# Patient Record
Sex: FEMALE | Race: WHITE | NOT HISPANIC OR LATINO | Employment: UNEMPLOYED | URBAN - METROPOLITAN AREA
[De-identification: names, ages, dates, MRNs, and addresses within clinical notes are randomized per-mention and may not be internally consistent; named-entity substitution may affect disease eponyms.]

---

## 2017-09-21 ENCOUNTER — HOSPITAL ENCOUNTER (EMERGENCY)
Facility: HOSPITAL | Age: 37
Discharge: HOME | End: 2017-09-21
Attending: EMERGENCY MEDICINE
Payer: MEDICAID

## 2017-09-21 VITALS
DIASTOLIC BLOOD PRESSURE: 82 MMHG | WEIGHT: 104 LBS | BODY MASS INDEX: 19.14 KG/M2 | SYSTOLIC BLOOD PRESSURE: 127 MMHG | HEIGHT: 62 IN | TEMPERATURE: 99 F | OXYGEN SATURATION: 99 % | HEART RATE: 88 BPM | RESPIRATION RATE: 16 BRPM

## 2017-09-21 DIAGNOSIS — L02.91 ABSCESS: Primary | ICD-10-CM

## 2017-09-21 LAB
B-HCG UR QL: NEGATIVE
CTP QC/QA: YES

## 2017-09-21 PROCEDURE — 99283 EMERGENCY DEPT VISIT LOW MDM: CPT | Mod: 25

## 2017-09-21 PROCEDURE — 25000003 PHARM REV CODE 250: Performed by: EMERGENCY MEDICINE

## 2017-09-21 PROCEDURE — 81025 URINE PREGNANCY TEST: CPT

## 2017-09-21 PROCEDURE — 10060 I&D ABSCESS SIMPLE/SINGLE: CPT

## 2017-09-21 RX ORDER — FLUTICASONE PROPIONATE 50 MCG
1 SPRAY, SUSPENSION (ML) NASAL DAILY
COMMUNITY
End: 2017-10-27

## 2017-09-21 RX ORDER — HYDROCODONE BITARTRATE AND ACETAMINOPHEN 5; 325 MG/1; MG/1
1 TABLET ORAL EVERY 6 HOURS PRN
Qty: 12 TABLET | Refills: 0 | Status: SHIPPED | OUTPATIENT
Start: 2017-09-21 | End: 2017-10-27

## 2017-09-21 RX ORDER — BUPROPION HYDROCHLORIDE 300 MG/1
300 TABLET ORAL DAILY
COMMUNITY
End: 2019-05-01

## 2017-09-21 RX ORDER — ACETAMINOPHEN AND CODEINE PHOSPHATE 300; 60 MG/1; MG/1
TABLET ORAL
COMMUNITY
End: 2017-10-27

## 2017-09-21 RX ORDER — OXCARBAZEPINE 600 MG/1
150 TABLET, FILM COATED ORAL 2 TIMES DAILY
COMMUNITY
End: 2019-05-01

## 2017-09-21 RX ORDER — ACYCLOVIR 800 MG/1
200 TABLET ORAL
COMMUNITY
End: 2017-10-27

## 2017-09-21 RX ORDER — LIDOCAINE HYDROCHLORIDE 10 MG/ML
10 INJECTION INFILTRATION; PERINEURAL
Status: COMPLETED | OUTPATIENT
Start: 2017-09-21 | End: 2017-09-21

## 2017-09-21 RX ORDER — SULFAMETHOXAZOLE AND TRIMETHOPRIM 800; 160 MG/1; MG/1
1 TABLET ORAL
COMMUNITY
End: 2017-10-27

## 2017-09-21 RX ADMIN — LIDOCAINE HYDROCHLORIDE 10 ML: 10 INJECTION, SOLUTION INFILTRATION; PERINEURAL at 01:09

## 2017-09-21 RX ADMIN — Medication 3 ML: at 12:09

## 2017-09-21 NOTE — ED PROVIDER NOTES
Encounter Date: 9/21/2017       History     Chief Complaint   Patient presents with    Female  Problem     reports round lesion to vagina since saturday. pt was evaluated at [facility] and sent home with abx with no relief. pt recently dx with MRSA and is currently on medications.  pt would like for lesion to be looked at again     36F presents with a pubic abscess.  She states she has hx of MRSA.  She developed a bump over the weekend.  It has gotten bigger and is very painful.  She reports constant burning pain.  She went to primary care clinic for evaluation but states no one looked at it.  She was given acyclovir and bactrim.          Review of patient's allergies indicates:  No Known Allergies  Past Medical History:   Diagnosis Date    Behavioral problem     Pt reported only when she is drinking    Depression     History of psychiatric hospitalization     Hx of psychiatric care     Ovarian cyst     Psychiatric exam requested by authority     Psychiatric problem     PTSD (post-traumatic stress disorder)     STD (female)     Therapy      History reviewed. No pertinent surgical history.  Family History   Problem Relation Age of Onset    Depression Mother     Alcohol abuse Mother     Drug abuse Mother     Alcohol abuse Father     Drug abuse Father     Physical abuse Father     Depression Sister     Alcohol abuse Brother      Social History   Substance Use Topics    Smoking status: Current Every Day Smoker     Packs/day: 0.50     Years: 22.00    Smokeless tobacco: Never Used    Alcohol use 10.8 oz/week     18 Cans of beer per week      Comment: Pt reported her last drink was 7/19/16     Review of Systems   Constitutional: Negative for fever.   All other systems reviewed and are negative.      Physical Exam     Initial Vitals [09/21/17 1136]   BP Pulse Resp Temp SpO2   113/73 (!) 114 20 99 °F (37.2 °C) 100 %      MAP       86.33         Physical Exam    Nursing note and vitals  reviewed.  Constitutional: She appears well-developed and well-nourished. No distress.   HENT:   Head: Normocephalic and atraumatic.   Eyes: Conjunctivae are normal.   Neck: Normal range of motion.   Pulmonary/Chest: No respiratory distress.   Musculoskeletal: Normal range of motion.   Neurological: She is alert and oriented to person, place, and time.   Skin: Skin is warm and dry.        Abscess in pubic area   Psychiatric: She has a normal mood and affect. Her behavior is normal.         ED Course   I & D - Incision and Drainage  Date/Time: 9/21/2017 1:37 PM  Performed by: AINSLEY PETERS.  Authorized by: AINSLEY PETERS.   Consent Done: Emergent Situation  Type: abscess  Body area: trunk (pubic area)  Anesthesia: local infiltration    Anesthesia:  Local Anesthetic: lidocaine 1% without epinephrine  Patient sedated: no  Scalpel size: 11  Incision type: single straight  Complexity: simple  Drainage: pus  Drainage amount: copious  Wound treatment: incision,  drainage,  deloculation,  expression of material and  wound packed  Packing material: 1/4 in gauze  Patient tolerance: Patient tolerated the procedure well with no immediate complications        Labs Reviewed - No data to display          Medical Decision Making:   ED Management:  36F with pubic abscess - on abx and not getting better.  I&D performed.  Wound packed.  Continue bactrim.  Return to ER for any concerns.                   ED Course      Clinical Impression:   The encounter diagnosis was Abscess.                           Ainsley Peters MD  09/21/17 5989

## 2017-10-27 ENCOUNTER — HOSPITAL ENCOUNTER (EMERGENCY)
Facility: HOSPITAL | Age: 37
Discharge: HOME | End: 2017-10-27
Attending: EMERGENCY MEDICINE
Payer: MEDICAID

## 2017-10-27 VITALS
DIASTOLIC BLOOD PRESSURE: 61 MMHG | BODY MASS INDEX: 19.14 KG/M2 | RESPIRATION RATE: 19 BRPM | WEIGHT: 104 LBS | TEMPERATURE: 98 F | HEART RATE: 100 BPM | OXYGEN SATURATION: 100 % | HEIGHT: 62 IN | SYSTOLIC BLOOD PRESSURE: 122 MMHG

## 2017-10-27 DIAGNOSIS — L02.31 ABSCESS OF BUTTOCK, RIGHT: ICD-10-CM

## 2017-10-27 DIAGNOSIS — L02.411 ABSCESS OF AXILLA, RIGHT: Primary | ICD-10-CM

## 2017-10-27 LAB
B-HCG UR QL: NEGATIVE
CTP QC/QA: YES

## 2017-10-27 PROCEDURE — 99284 EMERGENCY DEPT VISIT MOD MDM: CPT

## 2017-10-27 RX ORDER — CLINDAMYCIN HYDROCHLORIDE 150 MG/1
300 CAPSULE ORAL 4 TIMES DAILY
Qty: 56 CAPSULE | Refills: 0 | Status: SHIPPED | OUTPATIENT
Start: 2017-10-27 | End: 2017-11-03

## 2017-10-27 RX ORDER — MUPIROCIN 20 MG/G
OINTMENT TOPICAL 3 TIMES DAILY
Qty: 22 G | Refills: 0 | Status: SHIPPED | OUTPATIENT
Start: 2017-10-27 | End: 2019-05-01

## 2017-10-27 RX ORDER — HYDROCODONE BITARTRATE AND ACETAMINOPHEN 7.5; 325 MG/1; MG/1
1 TABLET ORAL EVERY 6 HOURS PRN
Qty: 20 TABLET | Refills: 0 | Status: SHIPPED | OUTPATIENT
Start: 2017-10-27 | End: 2017-11-06

## 2017-10-28 NOTE — ED PROVIDER NOTES
Encounter Date: 10/27/2017       History     Chief Complaint   Patient presents with    Abscess     multiple abscesses to axilla and buttocks.      Patient is a 36-year-old female who complains of multiple abscesses. She says she has one under her right arm as well as on her buttock.  She has had these before and diagnosed with MRSA.  She has had no fever or chills.  No nausea or vomiting.  She complains of severe pain associated with these.       The history is provided by the patient.     Review of patient's allergies indicates:  No Known Allergies  Past Medical History:   Diagnosis Date    Behavioral problem     Pt reported only when she is drinking    Depression     History of psychiatric hospitalization     Hx of psychiatric care     Ovarian cyst     Psychiatric exam requested by authority     Psychiatric problem     PTSD (post-traumatic stress disorder)     STD (female)     Therapy      History reviewed. No pertinent surgical history.  Family History   Problem Relation Age of Onset    Depression Mother     Alcohol abuse Mother     Drug abuse Mother     Alcohol abuse Father     Drug abuse Father     Physical abuse Father     Depression Sister     Alcohol abuse Brother      Social History   Substance Use Topics    Smoking status: Current Every Day Smoker     Packs/day: 0.50     Years: 22.00    Smokeless tobacco: Never Used    Alcohol use 10.8 oz/week     18 Cans of beer per week      Comment: Pt reported her last drink was 7/19/16     Review of Systems   Constitutional: Negative for chills and fever.   Gastrointestinal: Negative for nausea and vomiting.   Skin: Positive for wound.   All other systems reviewed and are negative.      Physical Exam     Initial Vitals [10/27/17 1214]   BP Pulse Resp Temp SpO2   122/61 100 19 98.3 °F (36.8 °C) 100 %      MAP       81.33         Physical Exam    Nursing note and vitals reviewed.  Constitutional:   Anxious.   HENT:   Head: Normocephalic and  atraumatic.   Eyes: EOM are normal.   Neck: Neck supple.   Cardiovascular: Normal rate, regular rhythm and normal heart sounds.   Pulmonary/Chest: Breath sounds normal.   Abdominal: Soft. There is no tenderness.   Neurological: She is alert and oriented to person, place, and time.   Skin: Skin is warm and dry.   There is a small tender nodule of the right axilla with no overlying erythema, no fluctuance.  There is also a very small less than 1 cm abscess of the right buttock.   Psychiatric: Thought content normal.         ED Course   Procedures  Labs Reviewed - No data to display          Medical Decision Making:   ED Management:  36-year-old female with an early abscess of the right axilla and right buttock.  I do not feel these are currently amenable to incision and drainage.  She'll be placed on clindamycin and Bactroban ointment.  She'll follow-up with primary physician as soon as able for recheck and further treatment as warranted.                   ED Course      Clinical Impression:   The primary encounter diagnosis was Abscess of axilla, right. A diagnosis of Abscess of buttock, right was also pertinent to this visit.                           Adam Herron MD  10/28/17 6204

## 2018-06-16 ENCOUNTER — HOSPITAL ENCOUNTER (EMERGENCY)
Facility: HOSPITAL | Age: 38
Discharge: HOME | End: 2018-06-16
Attending: EMERGENCY MEDICINE
Payer: MEDICAID

## 2018-06-16 VITALS
BODY MASS INDEX: 19.63 KG/M2 | RESPIRATION RATE: 18 BRPM | HEART RATE: 82 BPM | WEIGHT: 104 LBS | DIASTOLIC BLOOD PRESSURE: 77 MMHG | HEIGHT: 61 IN | TEMPERATURE: 97 F | OXYGEN SATURATION: 100 % | SYSTOLIC BLOOD PRESSURE: 118 MMHG

## 2018-06-16 DIAGNOSIS — M25.519 SHOULDER PAIN, UNSPECIFIED CHRONICITY, UNSPECIFIED LATERALITY: Primary | ICD-10-CM

## 2018-06-16 DIAGNOSIS — R52 PAIN: ICD-10-CM

## 2018-06-16 LAB
B-HCG UR QL: NEGATIVE
CTP QC/QA: YES

## 2018-06-16 PROCEDURE — 63600175 PHARM REV CODE 636 W HCPCS: Performed by: NURSE PRACTITIONER

## 2018-06-16 PROCEDURE — 96372 THER/PROPH/DIAG INJ SC/IM: CPT

## 2018-06-16 PROCEDURE — 99284 EMERGENCY DEPT VISIT MOD MDM: CPT | Mod: 25

## 2018-06-16 PROCEDURE — 81025 URINE PREGNANCY TEST: CPT | Performed by: NURSE PRACTITIONER

## 2018-06-16 RX ORDER — NAPROXEN 500 MG/1
500 TABLET ORAL 2 TIMES DAILY WITH MEALS
Qty: 10 TABLET | Refills: 0 | Status: SHIPPED | OUTPATIENT
Start: 2018-06-16 | End: 2018-06-16 | Stop reason: CLARIF

## 2018-06-16 RX ORDER — IBUPROFEN 600 MG/1
600 TABLET ORAL EVERY 6 HOURS PRN
Qty: 20 TABLET | Refills: 0 | Status: SHIPPED | OUTPATIENT
Start: 2018-06-16 | End: 2019-05-01

## 2018-06-16 RX ORDER — CLONIDINE HYDROCHLORIDE 0.2 MG/1
0.2 TABLET ORAL 2 TIMES DAILY
COMMUNITY
End: 2019-05-01

## 2018-06-16 RX ORDER — KETOROLAC TROMETHAMINE 30 MG/ML
30 INJECTION, SOLUTION INTRAMUSCULAR; INTRAVENOUS
Status: COMPLETED | OUTPATIENT
Start: 2018-06-16 | End: 2018-06-16

## 2018-06-16 RX ADMIN — KETOROLAC TROMETHAMINE 30 MG: 30 INJECTION, SOLUTION INTRAMUSCULAR at 10:06

## 2018-06-17 NOTE — ED PROVIDER NOTES
"Encounter Date: 6/16/2018       History     Chief Complaint   Patient presents with    Shoulder Injury     37y F ambulatory to ED with c/o right shoulder and upper arm pain. pt reports feeling a "tearing" while swimming yesterday and pain increasing after fishing today     Patient is a 37-year-old female with past medical history of depression who presents to ED with right shoulder and rib pain x3 weeks.  Patient reports that 3 weeks ago she was on her front porch and accidentally fell on top of a baby gate.  Patient denies hitting her head and LOC.  Patient reports that she went swimming yesterday and fishing today and both activities increase her pain. Patient reports that she has been taking ibuprofen with minor relief, last dose was at 1600 tonight.  Patient reports that the pain is worse with movement and palpation. Patient denies any alleviating factors.  Patient denies fever, chills, dizziness, neck pain/stiffness, weakness, numbness, and tingling. Pt denies any other complaints at this time.       The history is provided by the patient.     Review of patient's allergies indicates:  No Known Allergies  Past Medical History:   Diagnosis Date    Behavioral problem     Pt reported only when she is drinking    Depression     History of psychiatric hospitalization     Hx of psychiatric care     Ovarian cyst     Psychiatric exam requested by authority     Psychiatric problem     PTSD (post-traumatic stress disorder)     STD (female)     Therapy      History reviewed. No pertinent surgical history.  Family History   Problem Relation Age of Onset    Depression Mother     Alcohol abuse Mother     Drug abuse Mother     Alcohol abuse Father     Drug abuse Father     Physical abuse Father     Depression Sister     Alcohol abuse Brother      Social History   Substance Use Topics    Smoking status: Current Every Day Smoker     Packs/day: 0.50     Years: 22.00    Smokeless tobacco: Never Used    " Alcohol use 10.8 oz/week     18 Cans of beer per week      Comment: Pt reported her last drink was 7/19/16     Review of Systems   Constitutional: Negative for chills and fever.   HENT: Negative for sore throat.    Respiratory: Negative for shortness of breath.    Cardiovascular: Negative for chest pain.   Musculoskeletal: Positive for arthralgias. Negative for back pain, joint swelling, neck pain and neck stiffness.        R shoulder and rib area   Skin: Negative for rash.   Neurological: Negative for dizziness, weakness and numbness.   Hematological: Does not bruise/bleed easily.   Psychiatric/Behavioral: The patient is nervous/anxious.        Physical Exam     Initial Vitals [06/16/18 2141]   BP Pulse Resp Temp SpO2   115/69 90 18 97.1 °F (36.2 °C) 100 %      MAP       --         Physical Exam    Nursing note and vitals reviewed.  Constitutional: Vital signs are normal. She appears well-developed and well-nourished. She is not diaphoretic.  Non-toxic appearance. She does not have a sickly appearance.   HENT:   Head: Normocephalic.   Eyes: Lids are normal.   Neck: Trachea normal, normal range of motion, full passive range of motion without pain and phonation normal. Neck supple.   Cardiovascular: Normal rate, regular rhythm and normal heart sounds.   Pulses:       Radial pulses are 2+ on the right side, and 2+ on the left side.   Pulmonary/Chest: Effort normal and breath sounds normal.   Musculoskeletal:        Right shoulder: She exhibits decreased range of motion, tenderness and pain. She exhibits no bony tenderness, no swelling, no effusion, no crepitus, no deformity, no laceration, no spasm, normal pulse and normal strength.   TTP to R shoulder and pain with abduction. No bony tenderness or deformity. Pt NVI.   Neurological: She is alert and oriented to person, place, and time. She has normal strength. No sensory deficit. Gait normal. GCS eye subscore is 4. GCS verbal subscore is 5. GCS motor subscore is 6.    Skin: Skin is warm, dry and intact. Capillary refill takes less than 2 seconds. No rash noted.   Psychiatric: Her mood appears anxious.         ED Course   Procedures  Labs Reviewed   POCT URINE PREGNANCY         Imaging Results          X-Ray Ribs 2 View Right (Final result)  Result time 06/16/18 22:33:04    Final result by Bong Ross MD (06/16/18 22:33:04)                 Impression:      No radiographic evidence of acute displaced right-sided rib fracture.      Electronically signed by: Bong Ross MD  Date:    06/16/2018  Time:    22:33             Narrative:    EXAMINATION:  XR RIBS 2 VIEW RIGHT    CLINICAL HISTORY:  Pain, unspecified    TECHNIQUE:  Two views of the right ribs were performed.    COMPARISON:  None    FINDINGS:  No definite displaced right-sided rib fractures are identified.  The right hemithorax appears free of consolidation.                               X-Ray Shoulder Trauma Right (Final result)  Result time 06/16/18 22:30:18    Final result by Bong Ross MD (06/16/18 22:30:18)                 Impression:      No radiographic evidence of acute fracture or dislocation of the right shoulder.      Electronically signed by: Bong Ross MD  Date:    06/16/2018  Time:    22:30             Narrative:    EXAMINATION:  XR SHOULDER TRAUMA 3 VIEW RIGHT    CLINICAL HISTORY:  Pain, unspecified    TECHNIQUE:  Three or four views of the right shoulder were performed.    COMPARISON:  None    FINDINGS:  There is no evidence of acute fracture or dislocation.  Glenohumeral alignment appears within normal limits.  The acromioclavicular joint appears well maintained.                                X-Ray Ribs 2 View Right   Final Result      No radiographic evidence of acute displaced right-sided rib fracture.         Electronically signed by: Bong Ross MD   Date:    06/16/2018   Time:    22:33      X-Ray Shoulder Trauma Right   Final Result      No radiographic evidence of acute  fracture or dislocation of the right shoulder.         Electronically signed by: Bong Ross MD   Date:    06/16/2018   Time:    22:30           Medical Decision Making:   Initial Assessment:   Pt presents to ED with R shoulder and rib area pain x 3 weeks. Pt appears anxious and non-toxic. 2+ radial pulses bilaterally by palpation. Sensation and strength intact bilaterally in upper extremities. Pt NVI.   Differential Diagnosis:   Sprain, strain, fracture, dislocation   Clinical Tests:   Radiological Study: Ordered and Reviewed  ED Management:  POCT pregnancy, Xray shoulder and rib, 30 mg IM toradol  Xrays normal. Pt is stable and will be d/c home. Pt instructed to use R.I.C.E. And to take prescribed Motrin as labeled as needed for pain. Patient should follow up with PCP within 2-3 days.  Return precautions given.    Rx: motrin                       Clinical Impression:   The primary encounter diagnosis was Shoulder pain, unspecified chronicity, unspecified laterality. A diagnosis of Pain was also pertinent to this visit.                             Dustin Pang NP  06/16/18 0729

## 2018-06-17 NOTE — ED TRIAGE NOTES
Patient presents to the ED with complaints of right upper arm, and right rib pain. Patient states 3 weeks ago she fell on her concrete porch on top of a baby gate. She was in pain but was not seen by a doctor. Yesterday she went swimming, after not using arm for 3 weeks, and while swimming became in a lot of pain. Today she also went fishing and cleaned her house. From 3 weeks till now she stated she went through a whole bottle of ibuprofen with the last dose being today around 4 pm. Patient expresses pain with abduction of right arm.  Patient holding arm up to torso.     Review of patient's allergies indicates:  No Known Allergies     Patient has verified the spelling of their name and  on armband.   APPEARANCE: Patient is alert, calm, oriented x 4, and does not appear distressed.  SKIN: Skin is normal for race, warm, and dry. Normal skin turgor and mucous membranes moist. Small red line noted by right rib area where patient stated she fell, no bruising noted  CARDIAC: Normal rate and rhythm, no murmur heard.   RESPIRATORY:Normal rate and effort. Breath sounds clear bilaterally throughout chest. Respirations are equal and unlabored.    GASTRO: Bowel sounds normal, abdomen is soft, no tenderness, and no abdominal distention.  MUSCLE: patient expresses pain with abduction of right arm. States right upper arm and right rib area are sensitive to touch. No deformity noted

## 2018-06-17 NOTE — DISCHARGE INSTRUCTIONS
Your xrays were normal. Please use R.I.C.E. And take naproxen as labeled and as needed for pain. Follow-up with [facility] within 2-3 days. Return to ED if symptoms worsen or change.

## 2018-10-17 ENCOUNTER — HOSPITAL ENCOUNTER (OUTPATIENT)
Dept: RADIOLOGY | Facility: OTHER | Age: 38
Discharge: HOME | End: 2018-10-17
Attending: NURSE PRACTITIONER
Payer: MEDICAID

## 2018-10-17 DIAGNOSIS — T07.XXXA CLOSED DISLOCATION, MULTIPLE AND ILL-DEFINED SITES: Primary | ICD-10-CM

## 2018-10-17 DIAGNOSIS — T07.XXXA CLOSED DISLOCATION, MULTIPLE AND ILL-DEFINED SITES: ICD-10-CM

## 2018-10-17 PROCEDURE — 73080 X-RAY EXAM OF ELBOW: CPT | Mod: TC,FY,RT

## 2018-10-17 PROCEDURE — 73562 X-RAY EXAM OF KNEE 3: CPT | Mod: TC,FY,RT

## 2018-10-17 PROCEDURE — 73562 X-RAY EXAM OF KNEE 3: CPT | Mod: 26,RT,, | Performed by: RADIOLOGY

## 2018-10-17 PROCEDURE — 73080 X-RAY EXAM OF ELBOW: CPT | Mod: 26,RT,, | Performed by: RADIOLOGY

## 2018-12-06 ENCOUNTER — HOSPITAL ENCOUNTER (OUTPATIENT)
Dept: RADIOLOGY | Facility: OTHER | Age: 38
Discharge: HOME | End: 2018-12-06
Attending: NURSE PRACTITIONER
Payer: MEDICAID

## 2018-12-06 DIAGNOSIS — Z12.31 ENCOUNTER FOR SCREENING MAMMOGRAM FOR MALIGNANT NEOPLASM OF BREAST: ICD-10-CM

## 2018-12-06 PROCEDURE — 77063 BREAST TOMOSYNTHESIS BI: CPT | Mod: TC

## 2018-12-06 PROCEDURE — 77063 BREAST TOMOSYNTHESIS BI: CPT | Mod: 26,,, | Performed by: RADIOLOGY

## 2018-12-06 PROCEDURE — 77067 SCR MAMMO BI INCL CAD: CPT | Mod: 26,,, | Performed by: RADIOLOGY

## 2019-05-01 ENCOUNTER — HOSPITAL ENCOUNTER (EMERGENCY)
Facility: HOSPITAL | Age: 39
Discharge: HOME | End: 2019-05-01
Attending: EMERGENCY MEDICINE
Payer: MEDICAID

## 2019-05-01 VITALS
BODY MASS INDEX: 20.98 KG/M2 | SYSTOLIC BLOOD PRESSURE: 118 MMHG | HEART RATE: 80 BPM | RESPIRATION RATE: 20 BRPM | TEMPERATURE: 98 F | HEIGHT: 62 IN | WEIGHT: 114 LBS | OXYGEN SATURATION: 98 % | DIASTOLIC BLOOD PRESSURE: 76 MMHG

## 2019-05-01 DIAGNOSIS — R06.02 SHORTNESS OF BREATH: ICD-10-CM

## 2019-05-01 DIAGNOSIS — S29.011A INTERCOSTAL MUSCLE STRAIN, INITIAL ENCOUNTER: Primary | ICD-10-CM

## 2019-05-01 LAB
B-HCG UR QL: NEGATIVE
CTP QC/QA: YES

## 2019-05-01 PROCEDURE — 81025 URINE PREGNANCY TEST: CPT | Performed by: EMERGENCY MEDICINE

## 2019-05-01 PROCEDURE — 63600175 PHARM REV CODE 636 W HCPCS: Performed by: PHYSICIAN ASSISTANT

## 2019-05-01 PROCEDURE — 99284 EMERGENCY DEPT VISIT MOD MDM: CPT | Mod: 25

## 2019-05-01 PROCEDURE — 96372 THER/PROPH/DIAG INJ SC/IM: CPT

## 2019-05-01 RX ORDER — KETOROLAC TROMETHAMINE 30 MG/ML
30 INJECTION, SOLUTION INTRAMUSCULAR; INTRAVENOUS
Status: COMPLETED | OUTPATIENT
Start: 2019-05-01 | End: 2019-05-01

## 2019-05-01 RX ORDER — DIAZEPAM 5 MG/1
5 TABLET ORAL EVERY 6 HOURS PRN
Qty: 10 TABLET | Refills: 0 | Status: SHIPPED | OUTPATIENT
Start: 2019-05-01 | End: 2019-05-31

## 2019-05-01 RX ORDER — NAPROXEN 500 MG/1
500 TABLET ORAL 2 TIMES DAILY WITH MEALS
Qty: 14 TABLET | Refills: 0 | Status: SHIPPED | OUTPATIENT
Start: 2019-05-01

## 2019-05-01 RX ORDER — PRAZOSIN HYDROCHLORIDE 1 MG/1
1 CAPSULE ORAL 3 TIMES DAILY
COMMUNITY

## 2019-05-01 RX ORDER — LISDEXAMFETAMINE DIMESYLATE CAPSULES 20 MG/1
20 CAPSULE ORAL EVERY MORNING
COMMUNITY
End: 2019-10-09

## 2019-05-01 RX ADMIN — KETOROLAC TROMETHAMINE 30 MG: 30 INJECTION, SOLUTION INTRAMUSCULAR at 02:05

## 2019-05-01 NOTE — ED NOTES
APPEARANCE: Alert, oriented and in no acute distress.  CARDIAC: Normal rate and rhythm, no murmur heard.   PERIPHERAL VASCULAR: peripheral pulses present. Normal cap refill. No edema. Warm to touch.    RESPIRATORY:Normal rate and effort, breath sounds clear bilaterally throughout chest. Respirations are equal and unlabored no obvious signs of distress.  GASTRO: soft, bowel sounds normal, no tenderness, no abdominal distention.  SKIN: Skin is warm and dry, normal skin turgor, mucous membranes moist.  NEURO: 5/5 strength major flexors/extensors bilaterally. Sensory intact to light touch bilaterally. [place] coma scale: eyes open spontaneously-4, oriented & converses-5, obeys commands-6. No neurological abnormalities.   MENTAL STATUS: awake, alert and aware of environment.  EYE: PERRL, both eyes: pupils brisk and reactive to light. Normal size.  ENT: EARS: no obvious drainage. NOSE: no active bleeding.

## 2019-05-01 NOTE — DISCHARGE INSTRUCTIONS
You have been prescribed Valium for pain. Please do not take this medication while working, drinking alcohol, swimming, or while driving/operating heavy machinery. This medication may cause drowsiness, dizziness, impair judgment, and reduce physical capabilities.You should not drive, operate heavy machinery, or make life changing decisions while taking this medication.      You have been prescribed Naproxen for pain. This is an Non-Steroidal Anti-Inflammatory (NSAID) Medication. Please do not take any additional NSAIDs while you are taking this medication including (Advil, Aleve, Motrin, Ibuprofen, Mobic\meloxicam, Naprosyn, Toradol, ketoralac, etc.). Please stop taking this medication if you experience: weakness, itching, yellow skin or eyes, joint pains, vomiting blood, blood or black stools, unusual weight gain, or swelling in your arms, legs, hands, or feet.     *

## 2019-05-01 NOTE — ED NOTES
38 year old female presents to ed cc of right upper back pain that began 30 mins pta. Patient reports being in shower when back pain began. Patient denies any fall or trauma states is unable to take deep breath due to pain in back. Patient ambulatory in no acute distress patient updated on plan of care nurse will continue to monitor

## 2019-10-09 ENCOUNTER — OFFICE VISIT (OUTPATIENT)
Dept: OBSTETRICS AND GYNECOLOGY | Facility: CLINIC | Age: 39
End: 2019-10-09
Payer: MEDICAID

## 2019-10-09 VITALS
WEIGHT: 117.69 LBS | RESPIRATION RATE: 18 BRPM | DIASTOLIC BLOOD PRESSURE: 74 MMHG | SYSTOLIC BLOOD PRESSURE: 98 MMHG | HEIGHT: 62 IN | BODY MASS INDEX: 21.66 KG/M2 | HEART RATE: 80 BPM

## 2019-10-09 DIAGNOSIS — N94.10 DYSPAREUNIA IN FEMALE: ICD-10-CM

## 2019-10-09 DIAGNOSIS — N88.8 SINGLE NABOTHIAN CYST: ICD-10-CM

## 2019-10-09 DIAGNOSIS — N92.1 MENORRHAGIA WITH IRREGULAR CYCLE: ICD-10-CM

## 2019-10-09 PROCEDURE — 87661 TRICHOMONAS VAGINALIS AMPLIF: CPT

## 2019-10-09 PROCEDURE — 99999 PR PBB SHADOW E&M-EST. PATIENT-LVL III: ICD-10-PCS | Mod: PBBFAC,,, | Performed by: ADVANCED PRACTICE MIDWIFE

## 2019-10-09 PROCEDURE — 87481 CANDIDA DNA AMP PROBE: CPT | Mod: 59

## 2019-10-09 PROCEDURE — 99204 OFFICE O/P NEW MOD 45 MIN: CPT | Mod: S$PBB,,, | Performed by: ADVANCED PRACTICE MIDWIFE

## 2019-10-09 PROCEDURE — 99999 PR PBB SHADOW E&M-EST. PATIENT-LVL III: CPT | Mod: PBBFAC,,, | Performed by: ADVANCED PRACTICE MIDWIFE

## 2019-10-09 PROCEDURE — 87491 CHLMYD TRACH DNA AMP PROBE: CPT

## 2019-10-09 PROCEDURE — 99204 PR OFFICE/OUTPT VISIT, NEW, LEVL IV, 45-59 MIN: ICD-10-PCS | Mod: S$PBB,,, | Performed by: ADVANCED PRACTICE MIDWIFE

## 2019-10-09 PROCEDURE — 87801 DETECT AGNT MULT DNA AMPLI: CPT

## 2019-10-09 PROCEDURE — 99213 OFFICE O/P EST LOW 20 MIN: CPT | Mod: PBBFAC,PO | Performed by: ADVANCED PRACTICE MIDWIFE

## 2019-10-09 RX ORDER — VALACYCLOVIR HYDROCHLORIDE 1 G/1
TABLET, FILM COATED ORAL
COMMUNITY
Start: 2019-09-05

## 2019-10-09 RX ORDER — TRANEXAMIC ACID 650 MG/1
1300 TABLET ORAL 3 TIMES DAILY
Qty: 30 TABLET | Refills: 0 | Status: SHIPPED | OUTPATIENT
Start: 2019-10-09 | End: 2019-10-14

## 2019-10-09 RX ORDER — LISDEXAMFETAMINE DIMESYLATE 30 MG/1
CAPSULE ORAL
COMMUNITY
Start: 2019-09-20

## 2019-10-09 NOTE — PROGRESS NOTES
Subjective:    Patient ID: Deborah Flowers is a 38 y.o. y.o. female.     Chief Complaint:   Chief Complaint   Patient presents with    Menstrual Problem     menses lasting 3 months    Painful [place]       History of Present Illness:  Deborah presents today complaining of heavy menses x 6 months reports a hx o ovarian and cervical cysts unsure the dating on this . Symptoms have been present for 6 months and have been gradually worsening. She also denies : abdominal pain, chills and dysuria.  She does not complain of vaginal discharge.  She is sexually active.  She uses no method.        ROS:   Review of Systems   Gastrointestinal: Positive for abdominal pain, nausea and vomiting.   Genitourinary: Positive for dyspareunia, menorrhagia and menstrual problem.   Musculoskeletal: Positive for back pain.   All other systems reviewed and are negative.          Objective:    Vital Signs:  Vitals:    10/09/19 1309   BP: 98/74   Pulse: 80   Resp: 18       Physical Exam:  General:  alert, cooperative, no distress   Head Normocephalic, without obvious abnormality, atraumatic   Neck .supple, symmetrical, trachea midline   Skin:  Skin color, texture, turgor normal. No rashes or lesions   Abdomen:  soft, non-tender; bowel sounds normal   Pelvis: External genitalia: normal general appearance  Urinary system: urethral meatus normal, bladder nontender  Vaginal: normal mucosa without prolapse or lesions  Cervix: normal appearance, GC prep obtained, nabothian cysts, Ct obtained   Uterus: normal single, nontender, normal size, shape, position  Adnexa: normal size, nontender bilaterally   Extremities extremities normal, atraumatic, no cyanosis or edema   Neurologic Grossly normal        Reviewed Recent US , NORMAL WITH A NOTED NABOTHIAN CYST 1.4 CM   Pt reports she has a new partner   Discussed dx and options of:Depo, Ablation, IUD. Pt will consider and call if desires these.     Assessment:      1. Menorrhagia with irregular  cycle    2. Dyspareunia in female    3. Single nabothian cyst          Plan:      PLAN:   1. Treatment per orders - Gen Probe and Affirm , Tranexamic acid rx   2.  Encouraged increased po fluid intake  3.  Follow up culture for sensitivities  4. Call or return to clinic prn if these symptoms worsen or fail to improve as anticipated.

## 2019-10-09 NOTE — LETTER
October 9, 2019      [place]  [id number] [facility] [address]           [facility]  [address]  [place]   [facility] [address]  Phone: [phone]  Fax: [phone]          Patient: Deborah Flowers   MR Number: [id number]   YOB: 1980   Date of Visit: 10/9/2019       Dear Juanita Lee:    Thank you for referring Deborah Flowers to me for evaluation. Attached you will find relevant portions of my assessment and plan of care.    If you have questions, please do not hesitate to call me. I look forward to following Deborah Flowers along with you.    Sincerely,    Mirian Priest, LEIGH    Enclosure  CC:  No Recipients    If you would like to receive this communication electronically, please contact [email or web address] or [phone] to request more information on [facility] [employer] access.    For providers and/or their staff who would like to refer a patient to [facility], please contact us through our one-stop-shop provider referral line, [facility], at [phone].    If you feel you have received this communication in error or would no longer like to receive these types of communications, please e-mail [email or web address]

## 2019-10-10 LAB
BACTERIAL VAGINOSIS DNA: POSITIVE
C TRACH DNA SPEC QL NAA+PROBE: NOT DETECTED
CANDIDA GLABRATA DNA: NEGATIVE
CANDIDA KRUSEI DNA: NEGATIVE
CANDIDA RRNA VAG QL PROBE: NEGATIVE
N GONORRHOEA DNA SPEC QL NAA+PROBE: NOT DETECTED
T VAGINALIS RRNA GENITAL QL PROBE: NEGATIVE

## 2019-10-10 RX ORDER — METRONIDAZOLE 500 MG/1
500 TABLET ORAL EVERY 12 HOURS
Qty: 14 TABLET | Refills: 0 | Status: SHIPPED | OUTPATIENT
Start: 2019-10-10 | End: 2019-10-17

## 2020-03-20 NOTE — ED NOTES
Patient's coworker may have been exposed to a family member with Covid-19 virus . . .not confirmed. Based on health history, should patient take a leave from work, or, is it okay to continue? Aware of office schedule today and message would be reviewed early next week. Phone #: [phone]   Pt states she noticed possible staff infection to groin area.  Pt states she was seen at [facility] and prescribed bactrim.  Pt has been taking bactrim since 9/19/17 without relief.

## 2022-01-02 ENCOUNTER — NURSE TRIAGE (OUTPATIENT)
Dept: ADMINISTRATIVE | Facility: CLINIC | Age: 42
End: 2022-01-02
Payer: MEDICAID

## 2022-01-02 NOTE — TELEPHONE ENCOUNTER
[place]    PCP:  None    Started with cough, chills, body aches, sever HA, fatigue, weakness, rash on forehead, sore throat, and fever on Friday.  Reports that she couldn't move for 48 hrs.  Reports temp of 102 on Friday and Saturday.  Denies fever, chills, and body aches are gone.  Was exposed on Sat to someone and at work that tested +.  Works at an electric company.  Per protocol, care advised is home care.  Instructed pt that testing needs to be completed within 3 days of becoming ill.  Pt agrees and VU.  Instructed to call for questions/concerns.  VU.  Unable to route to MD.    Reason for Disposition   COVID-19 Testing, questions about    Additional Information   Negative: SEVERE difficulty breathing (e.g., struggling for each breath, speaks in single words)   Negative: Difficult to awaken or acting confused (e.g., disoriented, slurred speech)   Negative: Bluish (or gray) lips or face now   Negative: Shock suspected (e.g., cold/pale/clammy skin, too weak to stand, low BP, rapid pulse)   Negative: Sounds like a life-threatening emergency to the triager   Negative: SEVERE or constant chest pain or pressure (Exception: mild central chest pain, present only when coughing)   Negative: MODERATE difficulty breathing (e.g., speaks in phrases, SOB even at rest, pulse 100-120)   Negative: [1] Headache AND [2] stiff neck (can't touch chin to chest)   Negative: MILD difficulty breathing (e.g., minimal/no SOB at rest, SOB with walking, pulse <100)   Negative: Chest pain or pressure   Negative: Patient sounds very sick or weak to the triager   Negative: Fever > 103 F (39.4 C)   Negative: [1] Fever > 101 F (38.3 C) AND [2] age > 60 years   Negative: [1] Fever > 100.0 F (37.8 C) AND [2] bedridden (e.g., nursing home patient, CVA, chronic illness, recovering from surgery)   Negative: HIGH RISK for severe COVID complications (e.g., age > 64 years, obesity with BMI > 25, pregnant, chronic lung disease or other chronic  medical condition)  (Exception: Already seen by PCP and no new or worsening symptoms.)   Negative: [1] HIGH RISK patient AND [2] influenza is widespread in the community AND [3] ONE OR MORE respiratory symptoms: cough, sore throat, runny or stuffy nose   Negative: [1] HIGH RISK patient AND [2] influenza exposure within the last 7 days AND [3] ONE OR MORE respiratory symptoms: cough, sore throat, runny or stuffy nose   Negative: [1] COVID-19 infection suspected by caller or triager AND [2] mild symptoms (cough, fever, or others) AND [3] negative COVID-19 rapid test   Negative: Fever present > 3 days (72 hours)   Negative: [1] Fever returns after gone for over 24 hours AND [2] symptoms worse or not improved   Negative: [1] Continuous (nonstop) coughing interferes with work or school AND [2] no improvement using cough treatment per protocol   Negative: Cough present > 3 weeks   Negative: [1] COVID-19 diagnosed by positive lab test AND [2] NO symptoms (e.g., cough, fever, others)   Negative: [1] COVID-19 diagnosed by positive lab test AND [2] mild symptoms (e.g., cough, fever, others) AND [3] no complications or SOB   Negative: [1] COVID-19 diagnosed by doctor (or NP/PA) AND [2] mild symptoms (e.g., cough, fever, others) AND [3] no complications or SOB   Negative: [1] COVID-19 diagnosed AND [2] has mild nausea, vomiting or diarrhea   Negative: COVID-19 Home Isolation, questions about    Protocols used: CORONAVIRUS (COVID-19) DIAGNOSED OR [id number]

## 2022-05-27 ENCOUNTER — OFFICE VISIT (OUTPATIENT)
Dept: SPORTS MEDICINE | Facility: CLINIC | Age: 42
End: 2022-05-27
Payer: MEDICAID

## 2022-05-27 VITALS — TEMPERATURE: 98 F | BODY MASS INDEX: 23.16 KG/M2 | HEIGHT: 61 IN | WEIGHT: 122.69 LBS

## 2022-05-27 DIAGNOSIS — M25.311 SHOULDER INSTABILITY, RIGHT: Primary | ICD-10-CM

## 2022-05-27 DIAGNOSIS — G89.29 CHRONIC RIGHT SHOULDER PAIN: ICD-10-CM

## 2022-05-27 DIAGNOSIS — M25.511 CHRONIC RIGHT SHOULDER PAIN: ICD-10-CM

## 2022-05-27 PROCEDURE — 20610 DRAIN/INJ JOINT/BURSA W/O US: CPT | Mod: S$PBB,RT,, | Performed by: ORTHOPAEDIC SURGERY

## 2022-05-27 PROCEDURE — 99204 PR OFFICE/OUTPT VISIT, NEW, LEVL IV, 45-59 MIN: ICD-10-PCS | Mod: 25,S$PBB,, | Performed by: ORTHOPAEDIC SURGERY

## 2022-05-27 PROCEDURE — 1159F MED LIST DOCD IN RCRD: CPT | Mod: CPTII,,, | Performed by: ORTHOPAEDIC SURGERY

## 2022-05-27 PROCEDURE — 1160F RVW MEDS BY RX/DR IN RCRD: CPT | Mod: CPTII,,, | Performed by: ORTHOPAEDIC SURGERY

## 2022-05-27 PROCEDURE — 3008F PR BODY MASS INDEX (BMI) DOCUMENTED: ICD-10-PCS | Mod: CPTII,,, | Performed by: ORTHOPAEDIC SURGERY

## 2022-05-27 PROCEDURE — 99204 OFFICE O/P NEW MOD 45 MIN: CPT | Mod: 25,S$PBB,, | Performed by: ORTHOPAEDIC SURGERY

## 2022-05-27 PROCEDURE — 20610 LARGE JOINT ASPIRATION/INJECTION: R SUBACROMIAL BURSA: ICD-10-PCS | Mod: S$PBB,RT,, | Performed by: ORTHOPAEDIC SURGERY

## 2022-05-27 PROCEDURE — 1160F PR REVIEW ALL MEDS BY PRESCRIBER/CLIN PHARMACIST DOCUMENTED: ICD-10-PCS | Mod: CPTII,,, | Performed by: ORTHOPAEDIC SURGERY

## 2022-05-27 PROCEDURE — 1159F PR MEDICATION LIST DOCUMENTED IN MEDICAL RECORD: ICD-10-PCS | Mod: CPTII,,, | Performed by: ORTHOPAEDIC SURGERY

## 2022-05-27 PROCEDURE — 3008F BODY MASS INDEX DOCD: CPT | Mod: CPTII,,, | Performed by: ORTHOPAEDIC SURGERY

## 2022-05-27 PROCEDURE — 99214 OFFICE O/P EST MOD 30 MIN: CPT | Mod: PBBFAC,PN | Performed by: ORTHOPAEDIC SURGERY

## 2022-05-27 PROCEDURE — 20610 DRAIN/INJ JOINT/BURSA W/O US: CPT | Mod: PBBFAC,PN | Performed by: ORTHOPAEDIC SURGERY

## 2022-05-27 PROCEDURE — 99999 PR PBB SHADOW E&M-EST. PATIENT-LVL IV: ICD-10-PCS | Mod: PBBFAC,,, | Performed by: ORTHOPAEDIC SURGERY

## 2022-05-27 PROCEDURE — 99999 PR PBB SHADOW E&M-EST. PATIENT-LVL IV: CPT | Mod: PBBFAC,,, | Performed by: ORTHOPAEDIC SURGERY

## 2022-05-27 RX ORDER — TRIAMCINOLONE ACETONIDE 40 MG/ML
80 INJECTION, SUSPENSION INTRA-ARTICULAR; INTRAMUSCULAR
Status: DISCONTINUED | OUTPATIENT
Start: 2022-05-27 | End: 2022-05-27 | Stop reason: HOSPADM

## 2022-05-27 RX ORDER — LISDEXAMFETAMINE DIMESYLATE 40 MG/1
40 CAPSULE ORAL EVERY MORNING
COMMUNITY
Start: 2022-05-23

## 2022-05-27 RX ADMIN — TRIAMCINOLONE ACETONIDE 80 MG: 40 INJECTION, SUSPENSION INTRA-ARTICULAR; INTRAMUSCULAR at 11:05
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
## 2022-05-27 NOTE — PROCEDURES
Large Joint Aspiration/Injection: R subacromial bursa    Date/Time: 5/27/2022 11:00 AM  Performed by: Tripp Taylor PA-C  Authorized by: Tripp Taylor PA-C     Consent Done?:  Yes (Verbal)  Indications:  Pain  Site marked: the procedure site was marked    Timeout: prior to procedure the correct patient, procedure, and site was verified    Prep: patient was prepped and draped in usual sterile fashion      Local anesthesia used?: Yes    Local anesthetic:  Lidocaine spray    Details:  Needle Size:  25 G  Ultrasonic Guidance for needle placement?: No    Approach:  Posterior  Location:  Shoulder  Site:  R subacromial bursa  Medications:  80 mg triamcinolone acetonide 40 mg/mL  Patient tolerance:  Patient tolerated the procedure well with no immediate complications

## 2022-06-13 PROBLEM — Z74.09 IMPAIRED FUNCTIONAL MOBILITY AND ACTIVITY TOLERANCE: Status: ACTIVE | Noted: 2022-06-13

## 2022-06-13 PROBLEM — R29.898 DECREASED STRENGTH OF UPPER EXTREMITY: Status: ACTIVE | Noted: 2022-06-13

## 2024-11-07 ENCOUNTER — TELEPHONE (OUTPATIENT)
Dept: FAMILY MEDICINE | Facility: CLINIC | Age: 44
End: 2024-11-07

## 2024-11-07 NOTE — TELEPHONE ENCOUNTER
Spoke to pt informed her NP Anderson's scheduled has not opened yet but when it does we will give her a call to set up an establish care appointment with CANDIDO Barron. Pt verbally understood and stated she didn't need to be seen until the end of December beginning of Jan but before Jan 9th.

## 2024-12-23 ENCOUNTER — OFFICE VISIT (OUTPATIENT)
Dept: FAMILY MEDICINE | Facility: CLINIC | Age: 44
End: 2024-12-23

## 2024-12-23 VITALS
SYSTOLIC BLOOD PRESSURE: 118 MMHG | BODY MASS INDEX: 20.62 KG/M2 | HEIGHT: 61 IN | DIASTOLIC BLOOD PRESSURE: 84 MMHG | OXYGEN SATURATION: 97 % | TEMPERATURE: 99 F | HEART RATE: 81 BPM | WEIGHT: 109.25 LBS

## 2024-12-23 DIAGNOSIS — J06.9 UPPER RESPIRATORY TRACT INFECTION, UNSPECIFIED TYPE: ICD-10-CM

## 2024-12-23 DIAGNOSIS — A60.00 HERPES SIMPLEX INFECTION OF GENITOURINARY SYSTEM: ICD-10-CM

## 2024-12-23 DIAGNOSIS — F31.77 BIPOLAR 1 DISORDER, MIXED, PARTIAL REMISSION: Primary | ICD-10-CM

## 2024-12-23 DIAGNOSIS — F98.8 ATTENTION DEFICIT DISORDER, UNSPECIFIED TYPE: ICD-10-CM

## 2024-12-23 PROBLEM — N94.10 DYSPAREUNIA IN FEMALE: Status: RESOLVED | Noted: 2019-10-09 | Resolved: 2024-12-23

## 2024-12-23 PROBLEM — R29.898 DECREASED STRENGTH OF UPPER EXTREMITY: Status: RESOLVED | Noted: 2022-06-13 | Resolved: 2024-12-23

## 2024-12-23 PROBLEM — Z74.09 IMPAIRED FUNCTIONAL MOBILITY AND ACTIVITY TOLERANCE: Status: RESOLVED | Noted: 2022-06-13 | Resolved: 2024-12-23

## 2024-12-23 PROBLEM — N92.1 MENORRHAGIA WITH IRREGULAR CYCLE: Status: RESOLVED | Noted: 2019-10-09 | Resolved: 2024-12-23

## 2024-12-23 PROCEDURE — 99999 PR PBB SHADOW E&M-EST. PATIENT-LVL IV: CPT | Mod: PBBFAC,,, | Performed by: NURSE PRACTITIONER

## 2024-12-23 PROCEDURE — 99214 OFFICE O/P EST MOD 30 MIN: CPT | Mod: PBBFAC,PN | Performed by: NURSE PRACTITIONER

## 2024-12-23 PROCEDURE — 99214 OFFICE O/P EST MOD 30 MIN: CPT | Mod: S$PBB,,, | Performed by: NURSE PRACTITIONER

## 2024-12-23 RX ORDER — ACYCLOVIR 800 MG/1
800 TABLET ORAL DAILY
Qty: 30 TABLET | Refills: 11 | Status: SHIPPED | OUTPATIENT
Start: 2024-12-23

## 2024-12-23 RX ORDER — DEXTROAMPHETAMINE SACCHARATE, AMPHETAMINE ASPARTATE MONOHYDRATE, DEXTROAMPHETAMINE SULFATE AND AMPHETAMINE SULFATE 7.5; 7.5; 7.5; 7.5 MG/1; MG/1; MG/1; MG/1
CAPSULE, EXTENDED RELEASE ORAL
COMMUNITY
Start: 2023-09-23 | End: 2024-12-23 | Stop reason: SDUPTHER

## 2024-12-23 RX ORDER — DEXTROAMPHETAMINE SACCHARATE, AMPHETAMINE ASPARTATE MONOHYDRATE, DEXTROAMPHETAMINE SULFATE AND AMPHETAMINE SULFATE 7.5; 7.5; 7.5; 7.5 MG/1; MG/1; MG/1; MG/1
30 CAPSULE, EXTENDED RELEASE ORAL EVERY MORNING
Qty: 30 CAPSULE | Refills: 0 | Status: SHIPPED | OUTPATIENT
Start: 2024-12-23

## 2024-12-23 NOTE — LETTER
December 23, 2024      [facility]  [address]  [address]  [place] [id number]  Phone: [phone]  Fax: [phone]       Patient: Deborah Flowers   YOB: 1980  Date of Visit: 12/23/2024    To Whom It May Concern:    Aki Flowers  was at [facility] on 12/23/2024. Please excuse the patient from work for short term leave of absence due to mental health from 12/23/2024-1/6/2025. If you have any questions or concerns, or if I can be of further assistance, please do not hesitate to contact me.    Sincerely,        Katya Barron NP

## 2024-12-23 NOTE — LETTER
December 23, 2024      [facility]  [address]  [address]  [place] [id number]  Phone: [phone]  Fax: [phone]       Patient: Deborah Flowers   YOB: 1980  Date of Visit: 12/23/2024    To Whom It May Concern:    Aki Flowers  was at [facility] [employer] on 12/23/2024. Please excuse patient from work on 12/19/2024 and 12/20/2024 for illness. If you have any questions or concerns, or if I can be of further assistance, please do not hesitate to contact me.    Sincerely,        Katya Barron NP

## 2024-12-25 NOTE — PROGRESS NOTES
" Patient ID: Deborah Flowers is a 44 y.o. female.     Chief Complaint: Medication Refill, Anxiety, and Depression      HPI:  Ms Flowers is here for medication refill, cold symptoms and worsening anxiety and depression. She recently lost 3 family member and has been having a "rough time". She reports severe work stress. She has not been sleeping, has been anxious and depressed,  and has lost about 10 pounds. She has a long history of bipolar d/o, anxiety and depression and had been stable on Vraylar 1.5 mg for some time. She thinks the recent stress has caused a bipolar flare up. She denies SI, drug or alcohol use.     URI   This is a new problem. Episode onset: 2 weeks. The problem has been gradually improving. There has been no fever. The cough is Productive of sputum. Associated symptoms include congestion, coughing, headaches, rhinorrhea, sneezing and wheezing. Pertinent negatives include no chest pain or vomiting. She has tried antihistamine, decongestant, increased fluids, NSAIDs and inhaler use for the symptoms. The treatment provided moderate relief.         Problem List Items Addressed This Visit       Attention deficit disorder    Overview     Adderal XR sebas  Works FT  NO med SE  Stable at current dose         Current Assessment & Plan     Continue current dose  Discussed risks of tolerance and dependence as well as cardiac SE         Relevant Medications    dextroamphetamine-amphetamine (ADDERALL XR) 30 MG 24 hr capsule    Bipolar 1 disorder, mixed, partial remission - Primary    Overview     Diagnosed many years ago  Self medicated for years but now on Vraylar  Had been doing really well, workign FT, but recently has had 3 deaths in family and severe work stress has caused worsening symptoms of anxiety, depression, insomnia, weight loss, unstable emotions.            Current Assessment & Plan     Increase Vraylar to 3 mg daily, start with QOD and advance as tolerated  Rec counseling  MATTEO from work x 2 " weeks for med adjustment          Herpes simplex infection of genitourinary system    Overview     On daily prevention with Valtrex  Expensive so requesting cheaper alternative  No recent outbreaks          Current Assessment & Plan     Change to Acyclovir with Good RX coupon  Follow up if any worsening of outbreaks         Relevant Medications    acyclovir (ZOVIRAX) 800 MG Tab     Other Visit Diagnoses       Upper respiratory tract infection, unspecified type               Review of Systems  Review of Systems   Constitutional: Negative.    HENT:  Positive for congestion, sinus pain and sore throat.    Eyes:  Positive for blurred vision.   Respiratory:  Positive for cough and wheezing. Negative for sputum production and shortness of breath.    Cardiovascular: Negative.    Gastrointestinal: Negative.    Genitourinary: Negative.    Musculoskeletal: Negative.    Skin: Negative.    Neurological: Negative.    Endo/Heme/Allergies: Negative.    Psychiatric/Behavioral:  Positive for depression. Negative for suicidal ideas. The patient is nervous/anxious and has insomnia.        Currently Medications  Current Outpatient Medications on File Prior to Visit   Medication Sig Dispense Refill    cariprazine (VRAYLAR) 1.5 mg Cap Take 1.5 mg by mouth once daily.       No current facility-administered medications on file prior to visit.       Allergies  Review of patient's allergies indicates:  No Known Allergies     Health Maintenance  Health Maintenance Topics with due status: Not Due       Topic Last Completion Date    RSV Vaccine (Age 60+ and Pregnant patients) Not Due          PMH:  Past Medical History:   Diagnosis Date    Abnormal Pap smear of cervix     Behavioral problem     Pt reported only when she is drinking    Depression     History of psychiatric hospitalization     Hx of psychiatric care     Ovarian cyst     Psychiatric exam requested by authority     Psychiatric problem     PTSD (post-traumatic stress disorder)      "STD (female)     Therapy       Past Surgical History:   Procedure Laterality Date    CERVICAL BIOPSY  W/ LOOP ELECTRODE EXCISION  2010    DILATION AND CURETTAGE OF UTERUS  1999    LAPAROSCOPY  1999           Physical  Exam  Vitals:    12/23/24 1459   BP: 118/84   BP Location: Right arm   Patient Position: Sitting   Pulse: 81   Temp: 98.8 °F (37.1 °C)   TempSrc: Temporal   SpO2: 97%   Weight: 49.6 kg (109 lb 3.8 oz)   Height: 5' 1" (1.549 m)      Body mass index is 20.64 kg/m².  Wt Readings from Last 3 Encounters:   12/23/24 49.6 kg (109 lb 3.8 oz)   05/27/22 55.7 kg (122 lb 11 oz)   10/09/19 53.4 kg (117 lb 11.2 oz)     12/2/2024    Physical Exam  Constitutional:       Appearance: Normal appearance. She is well-groomed.   HENT:      Head: Normocephalic.      Right Ear: No drainage. No middle ear effusion. Tympanic membrane is injected.      Left Ear: No drainage.  No middle ear effusion. Tympanic membrane is injected.      Nose: Congestion and rhinorrhea present. Rhinorrhea is clear.      Right Sinus: No frontal sinus tenderness.      Left Sinus: No frontal sinus tenderness.      Mouth/Throat:      Lips: Pink.      Mouth: Mucous membranes are moist.      Pharynx: Oropharynx is clear.   Cardiovascular:      Rate and Rhythm: Normal rate and regular rhythm.      Pulses: Normal pulses.      Heart sounds: Normal heart sounds.   Pulmonary:      Effort: Pulmonary effort is normal.      Breath sounds: Normal breath sounds.   Abdominal:      General: Bowel sounds are normal.      Palpations: Abdomen is soft.   Musculoskeletal:         General: Normal range of motion.      Cervical back: Normal range of motion.   Skin:     General: Skin is warm and dry.   Neurological:      Mental Status: She is alert and oriented to person, place, and time.   Psychiatric:         Mood and Affect: Mood is anxious.         Speech: Speech is rapid and pressured.         Behavior: Behavior is cooperative.         Cognition and Memory: Cognition " and memory normal.         Judgment: Judgment normal.             Assessment/Plan:    1. Bipolar 1 disorder, mixed, partial remission  Assessment & Plan:  After extension discussion I have recommended the patient to take a MATTEO for at least 2 weeks for medication adjustment. I have also recommended she seek counseling to address current grief reaction. She will also increase Vraylar to 3 mg daily, start with QOD and advance as tolerated. She will go to ER for any worsening SI, HI.   She will follow up with me via [facility] portal due to no insurance and cost of visit.     2. Upper respiratory tract infection, unspecified type  Use Mucinex DM as discussed, along w/ flonase, claritin.  Use Ibuprofen as needed for body aches, fever or chills  Honey, lemon tea, cough drops prn   Stay well hydrated, using water as well as electrolyte drinks.  Be sure to be eating a balanced diet and getting good rest.      3. Attention deficit disorder, unspecified type        Assessment & Plan:  Continue current dose  Discussed risks of tolerance and dependence as well as cardiac SE    Orders:  -     dextroamphetamine-amphetamine (ADDERALL XR) 30 MG 24 hr capsule; Take 1 capsule (30 mg total) by mouth every morning.  Dispense: 30 capsule; Refill: 0      4. Herpes simplex infection of genitourinary system      Assessment & Plan:  Change to Acyclovir with Good RX coupon  Follow up if any worsening of outbreaks    Orders:  -     acyclovir (ZOVIRAX) 800 MG Tab; Take 1 tablet (800 mg total) by mouth once daily.  Dispense: 30 tablet; Refill: 11       12/23/2024     1500 1530   Depression Patient Health Questionnaire (PHQ-2)     Over the last two weeks how often have you been bothered by little interest or pleasure in doing things Nearly every day Nearly every day   Over the last two weeks how often have you been bothered by feeling down, depressed or hopeless Nearly every day Nearly every day   PHQ-2 Total Score 6 6   Depression Patient  Health Questionnaire (PHQ-9)     Over the last two weeks how often have you been bothered by trouble falling or staying asleep, or sleeping too much -- Nearly every day   Over the last two weeks how often have you been bothered by feeling tired or having little energy -- More than half the days   Over the last two weeks how often have you been bothered by a poor appetite or overeating -- Nearly every day   Over the last two weeks how often have you been bothered by feeling bad about yourself - or that you are a failure or have let yourself or your family down -- More than half the days   Over the last two weeks how often have you been bothered by trouble concentrating on things, such as reading the newspaper or watching television -- Nearly every day   Over the last two weeks how often have you been bothered by moving or speaking so slowly that other people could have noticed. Or the opposite - being so fidgety or restless that you have been moving around a lot more than usual. -- Nearly every day   Over the last two weeks how often have you been bothered by thoughts that you would be better off dead, or of hurting yourself -- Not at all   If you checked off any problems, how difficult have these problems made it for you to do your work, take care of things at home or get along with other people? -- Extremely difficult   PHQ-9 Score -- 22   PHQ-9 Interpretation -- Severe             Follow up in about 2 weeks (around 1/6/2025), or if symptoms worsen or fail to improve.     Katya Barron NP  [facility]

## 2024-12-25 NOTE — ASSESSMENT & PLAN NOTE
Increase Vraylar to 3 mg daily, start with QOD and advance as tolerated  Rec counseling  MATTEO from work x 2 weeks for med adjustment

## 2025-01-07 ENCOUNTER — PATIENT MESSAGE (OUTPATIENT)
Dept: FAMILY MEDICINE | Facility: CLINIC | Age: 45
End: 2025-01-07

## 2025-01-23 ENCOUNTER — PATIENT MESSAGE (OUTPATIENT)
Dept: FAMILY MEDICINE | Facility: CLINIC | Age: 45
End: 2025-01-23

## 2025-01-28 ENCOUNTER — TELEPHONE (OUTPATIENT)
Dept: FAMILY MEDICINE | Facility: CLINIC | Age: 45
End: 2025-01-28

## 2025-01-28 NOTE — TELEPHONE ENCOUNTER
----- Message from Danis sent at 1/28/2025 10:46 AM CST -----  Type:  Call    Who Called:pt   Does the patient know what this is regarding?:cariprazine (VRAYLAR) 1.5 mg Cap  Missing information on form that was faxed   Would the patient rather a call back or a response via [facility]? Call   Best Call Back Number:[phone]  Additional Information: caller stated she can not afford out of pocket expenses and would like a refill asap

## 2025-01-28 NOTE — TELEPHONE ENCOUNTER
Called and spoke with pt in regards to her needing a refill on cariprazine (VRAYLAR). Pt states she is needing the fax, that was sent, to be filled out for her refill request. Pt stated that medication is expensive. Pt was informed that the provider will be notified of this matter, and will be contacted if anything else is needed.

## 2025-01-28 NOTE — TELEPHONE ENCOUNTER
----- Message from Danis sent at 1/28/2025 10:46 AM CST -----  Type:  Call    Who Called:pt   Does the patient know what this is regarding?:cariprazine (VRAYLAR) 1.5 mg Cap  Missing information on form that was faxed   Would the patient rather a call back or a response via [facility]? Call   Best Call Back Number:[phone]  Additional Information: caller stated she can not afford out of pocket expenses and would like a refill asap

## 2025-02-06 DIAGNOSIS — F98.8 ATTENTION DEFICIT DISORDER, UNSPECIFIED TYPE: ICD-10-CM

## 2025-02-06 RX ORDER — DEXTROAMPHETAMINE SACCHARATE, AMPHETAMINE ASPARTATE MONOHYDRATE, DEXTROAMPHETAMINE SULFATE AND AMPHETAMINE SULFATE 7.5; 7.5; 7.5; 7.5 MG/1; MG/1; MG/1; MG/1
30 CAPSULE, EXTENDED RELEASE ORAL EVERY MORNING
Qty: 30 CAPSULE | Refills: 0 | Status: SHIPPED | OUTPATIENT
Start: 2025-02-06

## 2025-03-07 DIAGNOSIS — F98.8 ATTENTION DEFICIT DISORDER, UNSPECIFIED TYPE: ICD-10-CM

## 2025-03-07 RX ORDER — DEXTROAMPHETAMINE SACCHARATE, AMPHETAMINE ASPARTATE MONOHYDRATE, DEXTROAMPHETAMINE SULFATE AND AMPHETAMINE SULFATE 7.5; 7.5; 7.5; 7.5 MG/1; MG/1; MG/1; MG/1
30 CAPSULE, EXTENDED RELEASE ORAL EVERY MORNING
Qty: 30 CAPSULE | Refills: 0 | Status: SHIPPED | OUTPATIENT
Start: 2025-03-07

## 2025-03-21 ENCOUNTER — OFFICE VISIT (OUTPATIENT)
Dept: FAMILY MEDICINE | Facility: CLINIC | Age: 45
End: 2025-03-21

## 2025-03-21 ENCOUNTER — TELEPHONE (OUTPATIENT)
Dept: FAMILY MEDICINE | Facility: CLINIC | Age: 45
End: 2025-03-21

## 2025-03-21 ENCOUNTER — PATIENT MESSAGE (OUTPATIENT)
Dept: FAMILY MEDICINE | Facility: CLINIC | Age: 45
End: 2025-03-21

## 2025-03-21 VITALS
WEIGHT: 110.25 LBS | BODY MASS INDEX: 20.82 KG/M2 | HEART RATE: 77 BPM | HEIGHT: 61 IN | SYSTOLIC BLOOD PRESSURE: 112 MMHG | OXYGEN SATURATION: 98 % | DIASTOLIC BLOOD PRESSURE: 82 MMHG

## 2025-03-21 DIAGNOSIS — F98.8 ATTENTION DEFICIT DISORDER, UNSPECIFIED TYPE: ICD-10-CM

## 2025-03-21 PROCEDURE — 99213 OFFICE O/P EST LOW 20 MIN: CPT | Mod: PBBFAC,PN | Performed by: NURSE PRACTITIONER

## 2025-03-21 PROCEDURE — 99999 PR PBB SHADOW E&M-EST. PATIENT-LVL III: CPT | Mod: PBBFAC,,, | Performed by: NURSE PRACTITIONER

## 2025-03-21 RX ORDER — DEXTROAMPHETAMINE SACCHARATE, AMPHETAMINE ASPARTATE MONOHYDRATE, DEXTROAMPHETAMINE SULFATE AND AMPHETAMINE SULFATE 7.5; 7.5; 7.5; 7.5 MG/1; MG/1; MG/1; MG/1
30 CAPSULE, EXTENDED RELEASE ORAL EVERY MORNING
Qty: 30 CAPSULE | Refills: 0 | Status: SHIPPED | OUTPATIENT
Start: 2025-03-21

## 2025-03-21 NOTE — TELEPHONE ENCOUNTER
Called and spoke with pt in regards of her message. Pt informed me that CANDIDO Barron is already advised of the matter and she working on the matter.     Sharing message with you.

## 2025-03-21 NOTE — TELEPHONE ENCOUNTER
----- Message from [email or web address] sent at 3/21/2025  2:48 PM CDT -----  Type: General Call Back Name of Caller:PtSymptoms:medication formWould the patient rather a call back or a response via [facility]? Call Moy Call Back Number:[phone] Additional Information: Pt advised the company closed her case and will not reopen it with the form sign and sent back from the provider. Pt is requesting that the provider refax the form to [phone] is waiting on the doctor's part of the form and her household size of 1cariprazine (VRAYLAR) 1.5 mg Cap

## 2025-03-21 NOTE — LETTER
March 21, 2025      [facility]  [address]  [address]  [place] [address]  Phone: [phone]  Fax: [phone]       Patient: Deborah Flowers   YOB: 1980  Date of Visit: 03/21/2025    To Whom It May Concern:    Aki Flowers  was at [facility] on 03/21/2025. The patient may return to work on 3/21/2025 with no restrictions. If you have any questions or concerns, or if I can be of further assistance, please do not hesitate to contact me.    Sincerely,    VIVIANA Alejo

## 2025-03-21 NOTE — PROGRESS NOTES
Patient ID: Deborah Flowers is a 44 y.o. female.     Chief Complaint: Medication Refill      HPI:  History of Present Illness    CHIEF COMPLAINT:  Patient presents today for follow up.    Ms Flowers is here for refill on Adderal. She is working 2 jobs currently about 50-60 hours per week. She reports medication is working well. She denies medication side effects. She is also stable on increased dose of Vraylar.       ROS:  General: -fever, -chills, +fatigue, +weight gain, -weight loss  Eyes: -vision changes, -redness, -discharge  ENT: -ear pain, -nasal congestion, -sore throat  Cardiovascular: -chest pain, -palpitations, -lower extremity edema  Respiratory: -cough, -shortness of breath  Gastrointestinal: -abdominal pain, -nausea, -vomiting, -diarrhea, -constipation, -blood in stool  Genitourinary: -dysuria, -hematuria, -frequency  Musculoskeletal: -joint pain, -muscle pain  Skin: -rash, +lesion  Neurological: -headache, -dizziness, -numbness, -tingling  Psychiatric: +anxiety, -depression, -sleep difficulty                Currently Medications  Medications Ordered Prior to Encounter[1]    Allergies  Review of patient's allergies indicates:  No Known Allergies     Health Maintenance  Health Maintenance Topics with due status: Not Due       Topic Last Completion Date    RSV Vaccine (Age 60+ and Pregnant patients) Not Due          PMH:  Past Medical History:   Diagnosis Date    Abnormal Pap smear of cervix     Behavioral problem     Pt reported only when she is drinking    Depression     History of psychiatric hospitalization     Hx of psychiatric care     Ovarian cyst     Psychiatric exam requested by authority     Psychiatric problem     PTSD (post-traumatic stress disorder)     STD (female)     Therapy       Past Surgical History:   Procedure Laterality Date    CERVICAL BIOPSY  W/ LOOP ELECTRODE EXCISION  2010    DILATION AND CURETTAGE OF UTERUS  1999    LAPAROSCOPY  1999          Physical  Exam  Vitals:     "03/21/25 1424   BP: 112/82   Pulse: 77   SpO2: 98%   Weight: 50 kg (110 lb 3.7 oz)   Height: 5' 1" (1.549 m)      Body mass index is 20.83 kg/m².  Wt Readings from Last 3 Encounters:   03/21/25 50 kg (110 lb 3.7 oz)   12/23/24 49.6 kg (109 lb 3.8 oz)   05/27/22 55.7 kg (122 lb 11 oz)       Physical Exam  Vitals and nursing note reviewed.   Constitutional:       Appearance: Normal appearance.   HENT:      Head: Normocephalic and atraumatic.      Mouth/Throat:      Mouth: Mucous membranes are moist.   Eyes:      Extraocular Movements: Extraocular movements intact.      Pupils: Pupils are equal, round, and reactive to light.   Cardiovascular:      Rate and Rhythm: Normal rate and regular rhythm.      Pulses: Normal pulses.      Heart sounds: Normal heart sounds.   Pulmonary:      Effort: Pulmonary effort is normal.      Breath sounds: Normal breath sounds.   Musculoskeletal:         General: Normal range of motion.      Cervical back: Normal range of motion.   Skin:     General: Skin is warm and dry.      Capillary Refill: Capillary refill takes less than 2 seconds.   Neurological:      Mental Status: She is alert and oriented to person, place, and time.   Psychiatric:         Mood and Affect: Mood normal.              Assessment/Plan:  1. Attention deficit disorder, unspecified type  - dextroamphetamine-amphetamine (ADDERALL XR) 30 MG 24 hr capsule; Take 1 capsule (30 mg total) by mouth every morning.  Dispense: 30 capsule; Refill: 0       Assessment & Plan     MEDICATION MANAGEMENT:  - Continued Vraylar at current dose.  - Continued Adderal at current dose, no signs of abuse.       FOLLOW-UP:  - Scheduled follow up in 3 months (June).  - Advised the patient to make appointment through portal.              This note was generated with the assistance of ambient listening technology. Verbal consent was obtained by the patient and accompanying visitor(s) for the recording of patient appointment to facilitate this note. I " attest to having reviewed and edited the generated note for accuracy, though some syntax or spelling errors may persist. Please contact the author of this note for any clarification.         Katya Barron NP  [facility]   03/21/2025            [1]   Current Outpatient Medications on File Prior to Visit   Medication Sig Dispense Refill    acyclovir (ZOVIRAX) 800 MG Tab Take 1 tablet (800 mg total) by mouth once daily. 30 tablet 11    cariprazine (VRAYLAR) 1.5 mg Cap Take 1.5 mg by mouth once daily.      [DISCONTINUED] dextroamphetamine-amphetamine (ADDERALL XR) 30 MG 24 hr capsule Take 1 capsule (30 mg total) by mouth every morning. 30 capsule 0     No current facility-administered medications on file prior to visit.

## 2025-03-26 ENCOUNTER — PATIENT MESSAGE (OUTPATIENT)
Dept: FAMILY MEDICINE | Facility: CLINIC | Age: 45
End: 2025-03-26

## 2025-03-27 ENCOUNTER — PATIENT MESSAGE (OUTPATIENT)
Dept: FAMILY MEDICINE | Facility: CLINIC | Age: 45
End: 2025-03-27

## 2025-04-08 DIAGNOSIS — F98.8 ATTENTION DEFICIT DISORDER, UNSPECIFIED TYPE: ICD-10-CM

## 2025-04-09 RX ORDER — DEXTROAMPHETAMINE SACCHARATE, AMPHETAMINE ASPARTATE MONOHYDRATE, DEXTROAMPHETAMINE SULFATE AND AMPHETAMINE SULFATE 7.5; 7.5; 7.5; 7.5 MG/1; MG/1; MG/1; MG/1
30 CAPSULE, EXTENDED RELEASE ORAL EVERY MORNING
Qty: 30 CAPSULE | Refills: 0 | Status: SHIPPED | OUTPATIENT
Start: 2025-04-09

## 2025-04-15 ENCOUNTER — PATIENT MESSAGE (OUTPATIENT)
Dept: FAMILY MEDICINE | Facility: CLINIC | Age: 45
End: 2025-04-15

## 2025-05-07 ENCOUNTER — OFFICE VISIT (OUTPATIENT)
Dept: FAMILY MEDICINE | Facility: CLINIC | Age: 45
End: 2025-05-07
Payer: COMMERCIAL

## 2025-05-07 VITALS
DIASTOLIC BLOOD PRESSURE: 81 MMHG | SYSTOLIC BLOOD PRESSURE: 102 MMHG | HEART RATE: 87 BPM | OXYGEN SATURATION: 99 % | HEIGHT: 61 IN | TEMPERATURE: 98 F | BODY MASS INDEX: 21.25 KG/M2 | WEIGHT: 112.56 LBS

## 2025-05-07 DIAGNOSIS — F98.8 ATTENTION DEFICIT DISORDER, UNSPECIFIED TYPE: ICD-10-CM

## 2025-05-07 DIAGNOSIS — F31.77 BIPOLAR 1 DISORDER, MIXED, PARTIAL REMISSION: ICD-10-CM

## 2025-05-07 DIAGNOSIS — Z56.6 STRESSFUL WORKPLACE: Primary | ICD-10-CM

## 2025-05-07 DIAGNOSIS — N93.9 VAGINAL BLEEDING: ICD-10-CM

## 2025-05-07 PROCEDURE — 1160F RVW MEDS BY RX/DR IN RCRD: CPT | Mod: CPTII,S$GLB,, | Performed by: NURSE PRACTITIONER

## 2025-05-07 PROCEDURE — 3079F DIAST BP 80-89 MM HG: CPT | Mod: CPTII,S$GLB,, | Performed by: NURSE PRACTITIONER

## 2025-05-07 PROCEDURE — 3074F SYST BP LT 130 MM HG: CPT | Mod: CPTII,S$GLB,, | Performed by: NURSE PRACTITIONER

## 2025-05-07 PROCEDURE — 99214 OFFICE O/P EST MOD 30 MIN: CPT | Mod: S$GLB,,, | Performed by: NURSE PRACTITIONER

## 2025-05-07 PROCEDURE — 1159F MED LIST DOCD IN RCRD: CPT | Mod: CPTII,S$GLB,, | Performed by: NURSE PRACTITIONER

## 2025-05-07 PROCEDURE — 99999 PR PBB SHADOW E&M-EST. PATIENT-LVL V: CPT | Mod: PBBFAC,,, | Performed by: NURSE PRACTITIONER

## 2025-05-07 PROCEDURE — 3008F BODY MASS INDEX DOCD: CPT | Mod: CPTII,S$GLB,, | Performed by: NURSE PRACTITIONER

## 2025-05-07 RX ORDER — CARIPRAZINE 3 MG/1
3 CAPSULE, GELATIN COATED ORAL
COMMUNITY

## 2025-05-07 RX ORDER — DEXTROAMPHETAMINE SACCHARATE, AMPHETAMINE ASPARTATE MONOHYDRATE, DEXTROAMPHETAMINE SULFATE AND AMPHETAMINE SULFATE 7.5; 7.5; 7.5; 7.5 MG/1; MG/1; MG/1; MG/1
30 CAPSULE, EXTENDED RELEASE ORAL EVERY MORNING
Qty: 30 CAPSULE | Refills: 0 | Status: SHIPPED | OUTPATIENT
Start: 2025-05-07

## 2025-05-07 SDOH — SOCIAL DETERMINANTS OF HEALTH (SDOH): OTHER PHYSICAL AND MENTAL STRAIN RELATED TO WORK: Z56.6
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
## 2025-05-13 ENCOUNTER — PATIENT MESSAGE (OUTPATIENT)
Dept: FAMILY MEDICINE | Facility: CLINIC | Age: 45
End: 2025-05-13
Payer: COMMERCIAL

## 2025-05-13 DIAGNOSIS — L70.0 ACNE CYSTICA: Primary | ICD-10-CM

## 2025-05-20 DIAGNOSIS — Z12.31 SCREENING MAMMOGRAM FOR BREAST CANCER: Primary | ICD-10-CM

## 2025-05-21 ENCOUNTER — RESULTS FOLLOW-UP (OUTPATIENT)
Dept: FAMILY MEDICINE | Facility: CLINIC | Age: 45
End: 2025-05-21

## 2025-06-05 ENCOUNTER — PATIENT MESSAGE (OUTPATIENT)
Dept: FAMILY MEDICINE | Facility: CLINIC | Age: 45
End: 2025-06-05
Payer: COMMERCIAL

## 2025-06-05 DIAGNOSIS — F98.8 ATTENTION DEFICIT DISORDER, UNSPECIFIED TYPE: ICD-10-CM

## 2025-06-06 RX ORDER — DEXTROAMPHETAMINE SACCHARATE, AMPHETAMINE ASPARTATE MONOHYDRATE, DEXTROAMPHETAMINE SULFATE AND AMPHETAMINE SULFATE 7.5; 7.5; 7.5; 7.5 MG/1; MG/1; MG/1; MG/1
30 CAPSULE, EXTENDED RELEASE ORAL EVERY MORNING
Qty: 30 CAPSULE | Refills: 0 | Status: SHIPPED | OUTPATIENT
Start: 2025-06-06

## 2025-06-26 ENCOUNTER — TELEPHONE (OUTPATIENT)
Dept: OBSTETRICS AND GYNECOLOGY | Facility: CLINIC | Age: 45
End: 2025-06-26

## 2025-06-26 DIAGNOSIS — F98.8 ATTENTION DEFICIT DISORDER, UNSPECIFIED TYPE: ICD-10-CM

## 2025-06-26 RX ORDER — DEXTROAMPHETAMINE SACCHARATE, AMPHETAMINE ASPARTATE MONOHYDRATE, DEXTROAMPHETAMINE SULFATE AND AMPHETAMINE SULFATE 7.5; 7.5; 7.5; 7.5 MG/1; MG/1; MG/1; MG/1
30 CAPSULE, EXTENDED RELEASE ORAL EVERY MORNING
Qty: 30 CAPSULE | Refills: 0 | Status: SHIPPED | OUTPATIENT
Start: 2025-06-26

## 2025-06-26 NOTE — TELEPHONE ENCOUNTER
Spoke to patient and scheduled her appointment due to her not having insurance she is not going to make it to the scheduled appointment.

## 2025-08-01 DIAGNOSIS — F98.8 ATTENTION DEFICIT DISORDER, UNSPECIFIED TYPE: ICD-10-CM

## 2025-08-01 RX ORDER — DEXTROAMPHETAMINE SACCHARATE, AMPHETAMINE ASPARTATE MONOHYDRATE, DEXTROAMPHETAMINE SULFATE AND AMPHETAMINE SULFATE 7.5; 7.5; 7.5; 7.5 MG/1; MG/1; MG/1; MG/1
30 CAPSULE, EXTENDED RELEASE ORAL EVERY MORNING
Qty: 30 CAPSULE | Refills: 0 | Status: SHIPPED | OUTPATIENT
Start: 2025-08-01

## 2025-08-04 ENCOUNTER — PATIENT MESSAGE (OUTPATIENT)
Dept: FAMILY MEDICINE | Facility: CLINIC | Age: 45
End: 2025-08-04

## 2025-08-04 NOTE — TELEPHONE ENCOUNTER
LOV with Katya Barron, VIVIANA , 5/7/2025    Pt inquiring about Adderall XR 30 mg refill    Last prescribed: 6/26/25    Refill request sent 8/1/25     Adderall XR 30 mg sent to [facility] in [place] 8/1/25    dextroamphetamine-amphetamine (ADDERALL XR) 30 MG 24 hr capsule 30 capsule 0 8/1/2025 -- No   Sig - Route: Take 1 capsule (30 mg total) by mouth every morning. - Oral   Sent to pharmacy as: dextroamphetamine-amphetamine (ADDERALL XR) 30 MG 24 hr capsule   Class: Normal   Earliest Fill Date: 8/1/2025   Order: [id number]   Date/Time Signed: 8/1/2025 13:16       E-Prescribing Status: Receipt confirmed by pharmacy (8/1/2025  1:16 PM CDT)     Pharmacy    [facility] #[id number] - [address]

## 2025-08-04 NOTE — TELEPHONE ENCOUNTER
Called [facility] pharmacy, no answer, left message    [facility] pharmacy in [place] returned call and stated that they do have Adderall XR 30 mg on file, but it is too soon to fill. Pt will have to call back tomorrow to fill    Pt will receive call or text message with this information, per pharmacy

## 2025-09-02 DIAGNOSIS — F98.8 ATTENTION DEFICIT DISORDER, UNSPECIFIED TYPE: ICD-10-CM

## 2025-09-02 DIAGNOSIS — A60.00 HERPES SIMPLEX INFECTION OF GENITOURINARY SYSTEM: ICD-10-CM

## 2025-09-02 RX ORDER — DEXTROAMPHETAMINE SACCHARATE, AMPHETAMINE ASPARTATE MONOHYDRATE, DEXTROAMPHETAMINE SULFATE AND AMPHETAMINE SULFATE 7.5; 7.5; 7.5; 7.5 MG/1; MG/1; MG/1; MG/1
30 CAPSULE, EXTENDED RELEASE ORAL EVERY MORNING
Qty: 30 CAPSULE | Refills: 0 | Status: SHIPPED | OUTPATIENT
Start: 2025-09-02

## 2025-09-02 RX ORDER — ACYCLOVIR 800 MG/1
800 TABLET ORAL DAILY
Qty: 30 TABLET | Refills: 11 | Status: SHIPPED | OUTPATIENT
Start: 2025-09-02 | End: 2025-09-05

## 2025-09-05 ENCOUNTER — OFFICE VISIT (OUTPATIENT)
Dept: OBSTETRICS AND GYNECOLOGY | Facility: CLINIC | Age: 45
End: 2025-09-05
Payer: COMMERCIAL

## 2025-09-05 ENCOUNTER — PATIENT OUTREACH (OUTPATIENT)
Dept: ADMINISTRATIVE | Facility: HOSPITAL | Age: 45
End: 2025-09-05
Payer: COMMERCIAL

## 2025-09-05 VITALS
DIASTOLIC BLOOD PRESSURE: 74 MMHG | WEIGHT: 113.38 LBS | BODY MASS INDEX: 21.41 KG/M2 | SYSTOLIC BLOOD PRESSURE: 119 MMHG | HEIGHT: 61 IN

## 2025-09-05 DIAGNOSIS — R79.89 ELEVATED PROLACTIN LEVEL: ICD-10-CM

## 2025-09-05 DIAGNOSIS — Z01.419 ENCOUNTER FOR ANNUAL ROUTINE GYNECOLOGICAL EXAMINATION: Primary | ICD-10-CM

## 2025-09-05 DIAGNOSIS — Z12.4 PAP SMEAR FOR CERVICAL CANCER SCREENING: ICD-10-CM

## 2025-09-05 DIAGNOSIS — Z11.3 SCREENING EXAMINATION FOR STD (SEXUALLY TRANSMITTED DISEASE): ICD-10-CM

## 2025-09-05 DIAGNOSIS — Z72.89 OTHER PROBLEMS RELATED TO LIFESTYLE: ICD-10-CM

## 2025-09-05 DIAGNOSIS — N93.9 ABNORMAL UTERINE BLEEDING (AUB): ICD-10-CM

## 2025-09-05 DIAGNOSIS — N89.8 VAGINAL IRRITATION: ICD-10-CM

## 2025-09-05 PROCEDURE — 99999 PR PBB SHADOW E&M-EST. PATIENT-LVL III: CPT | Mod: PBBFAC,,, | Performed by: OBSTETRICS & GYNECOLOGY

## 2025-09-05 RX ORDER — VALACYCLOVIR HYDROCHLORIDE 500 MG/1
500 TABLET, FILM COATED ORAL DAILY
Qty: 90 TABLET | Refills: 0 | Status: SHIPPED | OUTPATIENT
Start: 2025-09-05 | End: 2026-09-05